# Patient Record
(demographics unavailable — no encounter records)

---

## 2024-10-31 NOTE — DISCUSSION/SUMMARY
[FreeTextEntry1] : 84y/o male  PMH CAD w/ cardiac stents, AFib maintained on oral AC,  HLD, HTN, OA, CKD followed with Nephrology  Pt was seen by PMD. Pt is hemodynamically compensated. Medications for HLD, CAD, and HTN to be continued. Entresto was suspended. Repeat labs ordered.  [EKG obtained to assist in diagnosis and management of assessed problem(s)] : EKG obtained to assist in diagnosis and management of assessed problem(s)

## 2024-10-31 NOTE — REASON FOR VISIT
[Symptom and Test Evaluation] : symptom and test evaluation [Arrhythmia/ECG Abnorrmalities] : arrhythmia/ECG abnormalities [Hyperlipidemia] : hyperlipidemia [Hypertension] : hypertension [Coronary Artery Disease] : coronary artery disease [Other: ____] : [unfilled] [FreeTextEntry1] : 84 y/o male  PMH CAD w/ cardiac stents, HFrEF, AFib maintained on oral AC,  HLD, HTN, OA, CKD here today. Recently discharged from hospitals after an episode of CHF diuresis and elevated Cr. Pt denies chest pain or shortness of breath. D/C'd 10/26.  Stable

## 2024-10-31 NOTE — END OF VISIT
[FreeTextEntry3] : patient was seen with NP agree with assessment and plan  [Time Spent: ___ minutes] : I have spent [unfilled] minutes of time on the encounter which excludes teaching and separately reported services.

## 2024-11-20 NOTE — PHYSICAL EXAM
[Normal S1, S2] : normal S1, S2 [Soft] : abdomen soft [Non Tender] : non-tender [Normal Gait] : normal gait [No Edema] : no edema [Normal] : moves all extremities, no focal deficits, normal speech [Alert and Oriented] : alert and oriented [Normal Conjunctiva] : normal conjunctiva [Normal Venous Pressure] : normal venous pressure [Clear Lung Fields] : clear lung fields [No Rash] : no rash

## 2024-11-20 NOTE — END OF VISIT
[Time Spent: ___ minutes] : I have spent [unfilled] minutes of time on the encounter which excludes teaching and separately reported services. [FreeTextEntry3] : patient was seen with NP agree with assessment and plan

## 2024-11-20 NOTE — REASON FOR VISIT
[Symptom and Test Evaluation] : symptom and test evaluation [Arrhythmia/ECG Abnorrmalities] : arrhythmia/ECG abnormalities [Hyperlipidemia] : hyperlipidemia [Hypertension] : hypertension [Coronary Artery Disease] : coronary artery disease [Other: ____] : [unfilled] [FreeTextEntry1] : 86 y/o male PMH: CAD w/ cardiac stents, HFrEF (EF 50% 10/2024), AFib maintained on Xarelto, HLD, HTN, OA, CKD, s/p carotid endarterectomy, TIA, PAD, hospitalization in October for HF exac here today for hospital follow up.  When he was discharged from the hospital in October he was taking furosemide 20mg every other day. He started to feel SOB and started taking it every day. He was told to hold his furosemide for 3 days due to elevated creatinine.  He developed SOB after stopping furosemide and presented to ED.  He was given IV lasix and his symptoms improved.     He presents today with his wife and offers no complaints of cp, sob, palpitations, lightheadedness, PND, orthopnea or LE edema.  He reports his weight is stable.   Entresto was suspended on previous visit.  He was seen by nephrology yesterday.

## 2024-11-20 NOTE — CARDIOLOGY SUMMARY
[de-identified] : 11/20/24: Afib, LBBB [de-identified] : TTE 10/24/24   1. Technically difficult image quality.  2. Left ventricular systolic function is low-normal with an ejection fraction visually estimated at 50 %.  3. Mild left ventricular hypertrophy.  4. Normal right ventricular cavity size and probably normal right ventricular systolic function.  5. Left atrium is mildly dilated.  6. The right atrium is dilated.  7. Trileaflet aortic valve with normal systolic excursion. There is calcification of the aortic valve leaflets.  8. Mild mitral valve leaflet calcification.  9. Mild mitral regurgitation. 10. Mild tricuspid regurgitation. 11. The inferior vena cava is normal in size measuring 1.41 cm in diameter, (normal <2.1cm) with normal inspiratory collapse (normal >50%) consistent with normal right atrial pressure (~3, range 0-5mmHg). 12. Estimated pulmonary artery systolic pressure is 26 mmHg.

## 2024-11-20 NOTE — DISCUSSION/SUMMARY
[FreeTextEntry1] : Acute on chronic CHF exacerbation: Elevated BNP 6K, S/p Lasix 40mg IV in ED, symptoms improved. - Echo from 10/2024: EF 50%, mild MR, mild TR, LA and RA mildly dilated.  Discharged home on lasix 40mg daily.  Will repeat labs (BNP, BMP).  Daily weights, report weight gain 2 pounds or more in one day.  I emphasized the importance of low sodium diet.     Ischemia: Troponin negative x 1, EKG: afib, left axis deviation, LBBB, Hx of CAD s/p PCI x11: Continue simvastatin 40mg QD, fenofibrate 145mg, Plavix 75mg QD   Arrhythmia: hx afb, continue xarelto 15mg QD, coreg 12.5mg BID.  Maintain K>4, Mg>2.  HTN: BP stable, CW Coreg 12.5mg BID   Pt will follow up in 6 weeks.      [EKG obtained to assist in diagnosis and management of assessed problem(s)] : EKG obtained to assist in diagnosis and management of assessed problem(s)

## 2025-01-09 NOTE — DISCUSSION/SUMMARY
[EKG obtained to assist in diagnosis and management of assessed problem(s)] : EKG obtained to assist in diagnosis and management of assessed problem(s) [FreeTextEntry1] : here for follow up with above hx,  HFrEF: Clinically compensated GDMT limited by renal dysfunction ( Entresto previously discontinued)   HTN: Controlled  Carotid disease: followed with Vascular CW Statin   AF: CW oral AC   CAD: CW Plavix/statin  OV 3 months

## 2025-01-09 NOTE — PHYSICAL EXAM
[Normal S1, S2] : normal S1, S2 [Soft] : abdomen soft [No Edema] : no edema [Normal] : moves all extremities, no focal deficits, normal speech [Alert and Oriented] : alert and oriented [de-identified] : use of cane

## 2025-01-09 NOTE — REASON FOR VISIT
[Arrhythmia/ECG Abnorrmalities] : arrhythmia/ECG abnormalities [Coronary Artery Disease] : coronary artery disease [Other: ____] : [unfilled] [FreeTextEntry1] : 86 y/o male PMH CAD w/ cardiac stents, HFrEF (EF 50% 10/2024) AFib maintained on oral AC, HLD, HTN, OA, CKD, s/p carotid endarterectomy, TIA, PAD here today in routine cardiac visit   Currently feeling well no cardiac complaints weight has been stable, No SOB/PND/orthopnea

## 2025-01-09 NOTE — CARDIOLOGY SUMMARY
[de-identified] : 10/24/24: 1. Technically difficult image quality.  2. Left ventricular systolic function is low-normal with an ejection fraction visually estimated at 50 %.  3. Mild left ventricular hypertrophy.  4. Normal right ventricular cavity size and probably normal right ventricular systolic function.  5. Left atrium is mildly dilated.  6. The right atrium is dilated.  7. Trileaflet aortic valve with normal systolic excursion. There is calcification of the aortic valve leaflets.  8. Mild mitral valve leaflet calcification.  9. Mild mitral regurgitation. 10. Mild tricuspid regurgitation. 11. The inferior vena cava is normal in size measuring 1.41 cm in diameter, (normal <2.1cm) with normal inspiratory collapse (normal >50%) consistent with normal right atrial pressure ( R 3, range 0-5mmHg). 12. Estimated pulmonary artery systolic pressure is 26 mmHg.  [de-identified] : Cardiac Cath: 2010, 2014, 2/2016, 2010: 2 Vessel CAD with patent RCA stent and 90% LCX (Om1) and NL LV FX. 2014: 3 vessel CAD with Patent stent in obtuse marginal #1 but, distal LCx closed. New Lesion in the mid RCA with prior stent patent and new lesion in distal LAD. Low normal LV function with EF 50%. Postero-basal hypokinesis 2/2016 : All prior stents patent and new lesion in left circumflex.  Stent: 2010, 2014, 2016, 2010: Jayy to LCX, 2014: JAYY to RCA and distal LAD, 2016: JAYY to LCX

## 2025-04-29 NOTE — DISCUSSION/SUMMARY
[EKG obtained to assist in diagnosis and management of assessed problem(s)] : EKG obtained to assist in diagnosis and management of assessed problem(s) [FreeTextEntry1] : 85 year man with a history as listed presents for a followup cardiac evaluation.   Piyush had a recent admission for GIB. He is still anemic. Though denies any further GIB. He is back on his Xarelto for CVA risk reduction.  He had a Type II MI likely from demand in setting of anemia and known CAD.  He will continue Plavix for his multiple PCIs.  I think he has symptomatic anemia. Hb is so far stable. Pending labs tomorrow, IF his Hb decreases he will need to stop the Xarelto. Fu with GI and will need to see heme. May need iron infusion therapy,  He will continue Coreg.  He has mild lower extremity edema. He will continue Lasix 20mg Qday. Monitor weights.  He will continue with statin therapy to achieve maintain goal LDL<100 or ideally <70.  Exercise and diet counseling was performed in order to reduce her future cardiovascular risk. He will followup with me in 3 months or sooner if necessary.

## 2025-04-29 NOTE — PHYSICAL EXAM
[Normal Rate] : normal [Irregularly Irregular] : irregularly irregular [Normal S1] : normal S1 [Normal S2] : normal S2 [I] : a grade 1 [___ +] : bilateral [unfilled]U+ pretibial pitting edema [1+] : left 1+ [No Cyanosis] : no cyanosis [No Clubbing] : no clubbing [Normal] : alert and oriented, normal memory [Right Carotid Bruit] : no bruit heard over the right carotid [Left Carotid Bruit] : no bruit heard over the left carotid [de-identified] : walk with cane

## 2025-04-29 NOTE — CARDIOLOGY SUMMARY
[de-identified] : Atrial fibrillation  Left bundle branch block   [de-identified] : 10/2024  Left ventricular systolic function is low-normal with an ejection fraction visually estimated at 50 %. LV[facility]

## 2025-04-29 NOTE — HISTORY OF PRESENT ILLNESS
[FreeTextEntry1] : 85m with CAD s/p multiple PCI . s/p CEA,  HTN, HLD, AF, CKD, chronic systolic CHF, Crohn's disease, PAD and TIA presents for a followup visit.   He was admitted to  on 4/10/25 p/w melena. Pt reports dark stools, anorexia, fatigue over a week. Pt reports taking Aleve last week but is unsure if that preceded these symptoms. He also reports holding his lasix  while he was not eating and drinking much. Pt was admitted for acute blood loss anemia 2/2 gi bleeding. His xarelto and plavix were held and he was transfused 2 units with good effect. he was placed on a PPI BID and was seen by GI. given recent EGD and cessation of bleeding with medical management, repeat EGD was deffered at this time. Pt did develop asymptomatic NSVT and was seen by cardio. He was transfused a third unit with resolution of ventricular ectopy. I personally reviewed all of the hospital records available to me at this time, which included but are not limited to the discharge summary, labs and imaging reports.   Since his discharge he is still feeling weak. He feels lightheaded when changing positions. He is complaining of more BEST with walking few feet. No reported melena, hematochezia or hematemesis. He   denies any chest pain, PND, orthopnea, lower extremity edema, near syncope, syncope, strokelike symptoms. Medication reconciliation performed. He is compliant with his medications.

## 2025-06-13 NOTE — END OF VISIT
[Time Spent: ___ minutes] : I have spent [unfilled] minutes of time on the encounter which excludes teaching and separately reported services. [FreeTextEntry3] : I saw and evaluated the patient and discussed the care with the NP provider above on 06/11/2025 . I agree with the findings and plan as documented in the note above. He denies any anginal symptoms. Clinically he is euvolemic on exam. Hb stable. cont meds.

## 2025-06-13 NOTE — PHYSICAL EXAM
[Normal Rate] : normal [Normal S1] : normal S1 [Irregularly Irregular] : irregularly irregular [Normal S2] : normal S2 [I] : a grade 1 [___ +] : bilateral [unfilled]U+ pretibial pitting edema [1+] : left 1+ [No Cyanosis] : no cyanosis [No Clubbing] : no clubbing [Normal] : alert and oriented, normal memory [Right Carotid Bruit] : no bruit heard over the right carotid [Left Carotid Bruit] : no bruit heard over the left carotid [de-identified] : walks with cane

## 2025-06-13 NOTE — CARDIOLOGY SUMMARY
[de-identified] : Atrial fibrillation  Left bundle branch block   [de-identified] : 10/2024  Left ventricular systolic function is low-normal with an ejection fraction visually estimated at 50 %. LV[facility]

## 2025-06-13 NOTE — PHYSICAL EXAM
[Normal Rate] : normal [Irregularly Irregular] : irregularly irregular [Normal S1] : normal S1 [Normal S2] : normal S2 [I] : a grade 1 [___ +] : bilateral [unfilled]U+ pretibial pitting edema [1+] : left 1+ [No Cyanosis] : no cyanosis [No Clubbing] : no clubbing [Normal] : alert and oriented, normal memory [Right Carotid Bruit] : no bruit heard over the right carotid [Left Carotid Bruit] : no bruit heard over the left carotid [de-identified] : walks with cane

## 2025-06-13 NOTE — DISCUSSION/SUMMARY
[EKG obtained to assist in diagnosis and management of assessed problem(s)] : EKG obtained to assist in diagnosis and management of assessed problem(s) [FreeTextEntry1] : Piyush is an 85-year-old man with a history as listed presents for a followup cardiac evaluation.   Piyush had a recent admission for GIB. He is still anemic. Though denies any further GIB. He is back on his Xarelto for CVA risk reduction.  He had a Type II MI likely from demand in setting of anemia and known CAD.  He will continue Plavix for his multiple PCIs. Dr Dawkins feels  he has symptomatic anemia. Hb is so far stable. He is following up with hematology and GI. He will continue Coreg.  He has mild lower extremity edema. He will continue Lasix 20mg Qday. Monitor weights daily.  He will continue with statin therapy to achieve maintain goal LDL<100 or ideally <70.  Exercise and diet counseling was performed in order to reduce her future cardiovascular risk. He will followup with Dr Dawkins in September 2025 or sooner if necessary.  Dr Dawkins saw the patient and examined him and answered any questions he had.

## 2025-06-13 NOTE — REVIEW OF SYSTEMS
[Negative] : Heme/Lymph [Joint Pain] : joint pain [SOB] : no shortness of breath [FreeTextEntry9] : arthritis

## 2025-06-13 NOTE — HISTORY OF PRESENT ILLNESS
[FreeTextEntry1] : Piyush is an 85m with CAD s/p multiple PCI . s/p CEA,  HTN, HLD, AF, CKD, chronic systolic CHF, Crohn's disease, PAD and TIA presents for a follow-up visit.  He recently lost his wife last month to cancer and is trying to take care of himself and his home.  He walks with a cane and his biggest complaint is the arthritic pain that he feels in his lower extremities.  He was admitted to  on 4/10/25 p/w melena. Pt reported dark stools, anorexia, fatigue over a week at that time. Pt reports taking Aleve last week but is unsure if that preceded these symptoms.  He also reports holding his lasix  while he was not eating and drinking much at that time. Pt was admitted for acute blood loss anemia 2/2 gi bleeding. His xarelto and plavix were held and he was transfused 2 units with good effect. He was placed on a PPI BID and was seen by GI. Given recent EGD and cessation of bleeding with medical management, repeat EGD was deffered at this time. Pt did develop asymptomatic NSVT and was seen by cardio. He was transfused a third unit with resolution of ventricular ectopy. Dr Dawkins personally reviewed all of the hospital records available to him at that time, which included but are not limited to the discharge summary, labs and imaging reports.   Since his discharge he had been feeling weak until recently when he is now able to do his activities of daily living without any lightheadedness as he felt before. He has no more complaining of BEST with walking few feet.  About once a month he complains about difficulty breathing when he lies down in bed at night.  The next morning he takes Lasix 30 mg instead of Lasix 20 mg and feels better.  The last time this occurred was approximately 2 to 3 weeks ago.  No reported melena, hematochezia or hematemesis. He  denies any chest pain, PND, orthopnea, lower extremity edema, near syncope, syncope, strokelike symptoms. Medication reconciliation performed. He is compliant with his medications. He is following up with hematology in regard to his recent bleeding and abnormal blood work. He has been monitoring his weight at home and does daily weights.  His weight today is 171 pounds. His blood pressure was 132/75.

## 2025-06-13 NOTE — CARDIOLOGY SUMMARY
[de-identified] : Atrial fibrillation  Left bundle branch block   [de-identified] : 10/2024  Left ventricular systolic function is low-normal with an ejection fraction visually estimated at 50 %. LV[facility]

## 2025-06-13 NOTE — PHYSICAL EXAM
[Normal Rate] : normal [Normal S1] : normal S1 [Irregularly Irregular] : irregularly irregular [Normal S2] : normal S2 [I] : a grade 1 [___ +] : bilateral [unfilled]U+ pretibial pitting edema [1+] : left 1+ [No Cyanosis] : no cyanosis [No Clubbing] : no clubbing [Normal] : alert and oriented, normal memory [Right Carotid Bruit] : no bruit heard over the right carotid [Left Carotid Bruit] : no bruit heard over the left carotid [de-identified] : walks with cane

## 2025-06-13 NOTE — CARDIOLOGY SUMMARY
[de-identified] : Atrial fibrillation  Left bundle branch block   [de-identified] : 10/2024  Left ventricular systolic function is low-normal with an ejection fraction visually estimated at 50 %. LVH